# Patient Record
Sex: FEMALE | Race: OTHER | Employment: FULL TIME | URBAN - METROPOLITAN AREA
[De-identification: names, ages, dates, MRNs, and addresses within clinical notes are randomized per-mention and may not be internally consistent; named-entity substitution may affect disease eponyms.]

---

## 2022-04-06 ENCOUNTER — ANESTHESIA EVENT (OUTPATIENT)
Dept: SURGERY | Age: 57
End: 2022-04-06
Payer: COMMERCIAL

## 2022-04-08 ENCOUNTER — ANESTHESIA (OUTPATIENT)
Dept: SURGERY | Age: 57
End: 2022-04-08
Payer: COMMERCIAL

## 2022-04-08 ENCOUNTER — HOSPITAL ENCOUNTER (OUTPATIENT)
Age: 57
Setting detail: OUTPATIENT SURGERY
Discharge: HOME | End: 2022-04-08
Attending: ORTHOPAEDIC SURGERY | Admitting: ORTHOPAEDIC SURGERY
Payer: COMMERCIAL

## 2022-04-08 VITALS
WEIGHT: 145 LBS | TEMPERATURE: 97.9 F | DIASTOLIC BLOOD PRESSURE: 70 MMHG | SYSTOLIC BLOOD PRESSURE: 114 MMHG | HEIGHT: 62 IN | HEART RATE: 61 BPM | BODY MASS INDEX: 26.68 KG/M2 | RESPIRATION RATE: 16 BRPM | OXYGEN SATURATION: 99 %

## 2022-04-08 DIAGNOSIS — G89.18 ACUTE POST-OPERATIVE PAIN: Primary | ICD-10-CM

## 2022-04-08 LAB
GLUCOSE BLD STRIP.AUTO-MCNC: 93 MG/DL (ref 65–100)
POTASSIUM BLD-SCNC: 4.4 MMOL/L (ref 3.5–5.1)
SERVICE CMNT-IMP: NORMAL

## 2022-04-08 PROCEDURE — 76010000154 HC OR TIME FIRST 0.5 HR: Performed by: ORTHOPAEDIC SURGERY

## 2022-04-08 PROCEDURE — 76210000020 HC REC RM PH II FIRST 0.5 HR: Performed by: ORTHOPAEDIC SURGERY

## 2022-04-08 PROCEDURE — 76210000006 HC OR PH I REC 0.5 TO 1 HR: Performed by: ORTHOPAEDIC SURGERY

## 2022-04-08 PROCEDURE — 74011250636 HC RX REV CODE- 250/636: Performed by: NURSE PRACTITIONER

## 2022-04-08 PROCEDURE — 77030006788 HC BLD SAW OSC STRY -B: Performed by: ORTHOPAEDIC SURGERY

## 2022-04-08 PROCEDURE — 74011250636 HC RX REV CODE- 250/636: Performed by: ANESTHESIOLOGY

## 2022-04-08 PROCEDURE — 84132 ASSAY OF SERUM POTASSIUM: CPT

## 2022-04-08 PROCEDURE — 2709999900 HC NON-CHARGEABLE SUPPLY: Performed by: ORTHOPAEDIC SURGERY

## 2022-04-08 PROCEDURE — 28289 CORRJ HALUX RIGDUS W/O IMPLT: CPT | Performed by: ORTHOPAEDIC SURGERY

## 2022-04-08 PROCEDURE — 76060000032 HC ANESTHESIA 0.5 TO 1 HR: Performed by: ORTHOPAEDIC SURGERY

## 2022-04-08 PROCEDURE — 77030002982 HC SUT POLYSRB J&J -A: Performed by: ORTHOPAEDIC SURGERY

## 2022-04-08 PROCEDURE — 74011000250 HC RX REV CODE- 250: Performed by: ANESTHESIOLOGY

## 2022-04-08 PROCEDURE — 74011000250 HC RX REV CODE- 250: Performed by: ORTHOPAEDIC SURGERY

## 2022-04-08 PROCEDURE — 82962 GLUCOSE BLOOD TEST: CPT

## 2022-04-08 RX ORDER — LIDOCAINE HYDROCHLORIDE 10 MG/ML
0.1 INJECTION INFILTRATION; PERINEURAL AS NEEDED
Status: DISCONTINUED | OUTPATIENT
Start: 2022-04-08 | End: 2022-04-08 | Stop reason: HOSPADM

## 2022-04-08 RX ORDER — CEFAZOLIN SODIUM/WATER 2 G/20 ML
2 SYRINGE (ML) INTRAVENOUS ONCE
Status: COMPLETED | OUTPATIENT
Start: 2022-04-08 | End: 2022-04-08

## 2022-04-08 RX ORDER — CEPHALEXIN 500 MG/1
500 CAPSULE ORAL 4 TIMES DAILY
Qty: 12 CAPSULE | Refills: 0 | Status: SHIPPED | OUTPATIENT
Start: 2022-04-08

## 2022-04-08 RX ORDER — SODIUM CHLORIDE 0.9 % (FLUSH) 0.9 %
5-40 SYRINGE (ML) INJECTION EVERY 8 HOURS
Status: DISCONTINUED | OUTPATIENT
Start: 2022-04-08 | End: 2022-04-08 | Stop reason: HOSPADM

## 2022-04-08 RX ORDER — PROPOFOL 10 MG/ML
INJECTION, EMULSION INTRAVENOUS AS NEEDED
Status: DISCONTINUED | OUTPATIENT
Start: 2022-04-08 | End: 2022-04-08 | Stop reason: HOSPADM

## 2022-04-08 RX ORDER — SODIUM CHLORIDE, SODIUM LACTATE, POTASSIUM CHLORIDE, CALCIUM CHLORIDE 600; 310; 30; 20 MG/100ML; MG/100ML; MG/100ML; MG/100ML
75 INJECTION, SOLUTION INTRAVENOUS CONTINUOUS
Status: DISCONTINUED | OUTPATIENT
Start: 2022-04-08 | End: 2022-04-08 | Stop reason: HOSPADM

## 2022-04-08 RX ORDER — MIDAZOLAM HYDROCHLORIDE 1 MG/ML
2 INJECTION, SOLUTION INTRAMUSCULAR; INTRAVENOUS
Status: DISCONTINUED | OUTPATIENT
Start: 2022-04-08 | End: 2022-04-08 | Stop reason: HOSPADM

## 2022-04-08 RX ORDER — LIDOCAINE HYDROCHLORIDE 20 MG/ML
INJECTION, SOLUTION EPIDURAL; INFILTRATION; INTRACAUDAL; PERINEURAL AS NEEDED
Status: DISCONTINUED | OUTPATIENT
Start: 2022-04-08 | End: 2022-04-08 | Stop reason: HOSPADM

## 2022-04-08 RX ORDER — OXYCODONE HYDROCHLORIDE 5 MG/1
5 TABLET ORAL
Status: DISCONTINUED | OUTPATIENT
Start: 2022-04-08 | End: 2022-04-08 | Stop reason: HOSPADM

## 2022-04-08 RX ORDER — HYDROCODONE BITARTRATE AND ACETAMINOPHEN 5; 325 MG/1; MG/1
2 TABLET ORAL AS NEEDED
Status: DISCONTINUED | OUTPATIENT
Start: 2022-04-08 | End: 2022-04-08 | Stop reason: HOSPADM

## 2022-04-08 RX ORDER — SODIUM CHLORIDE 0.9 % (FLUSH) 0.9 %
5-40 SYRINGE (ML) INJECTION AS NEEDED
Status: DISCONTINUED | OUTPATIENT
Start: 2022-04-08 | End: 2022-04-08 | Stop reason: HOSPADM

## 2022-04-08 RX ORDER — ONDANSETRON 2 MG/ML
INJECTION INTRAMUSCULAR; INTRAVENOUS AS NEEDED
Status: DISCONTINUED | OUTPATIENT
Start: 2022-04-08 | End: 2022-04-08 | Stop reason: HOSPADM

## 2022-04-08 RX ORDER — BUPIVACAINE HYDROCHLORIDE 7.5 MG/ML
INJECTION, SOLUTION EPIDURAL; RETROBULBAR AS NEEDED
Status: DISCONTINUED | OUTPATIENT
Start: 2022-04-08 | End: 2022-04-08 | Stop reason: HOSPADM

## 2022-04-08 RX ORDER — MIDAZOLAM HYDROCHLORIDE 1 MG/ML
4 INJECTION, SOLUTION INTRAMUSCULAR; INTRAVENOUS ONCE
Status: DISCONTINUED | OUTPATIENT
Start: 2022-04-08 | End: 2022-04-08 | Stop reason: HOSPADM

## 2022-04-08 RX ORDER — FENTANYL CITRATE 50 UG/ML
100 INJECTION, SOLUTION INTRAMUSCULAR; INTRAVENOUS ONCE
Status: DISCONTINUED | OUTPATIENT
Start: 2022-04-08 | End: 2022-04-08 | Stop reason: HOSPADM

## 2022-04-08 RX ORDER — HYDROMORPHONE HYDROCHLORIDE 2 MG/ML
0.5 INJECTION, SOLUTION INTRAMUSCULAR; INTRAVENOUS; SUBCUTANEOUS
Status: DISCONTINUED | OUTPATIENT
Start: 2022-04-08 | End: 2022-04-08 | Stop reason: HOSPADM

## 2022-04-08 RX ORDER — PROPOFOL 10 MG/ML
INJECTION, EMULSION INTRAVENOUS
Status: DISCONTINUED | OUTPATIENT
Start: 2022-04-08 | End: 2022-04-08 | Stop reason: HOSPADM

## 2022-04-08 RX ORDER — ATORVASTATIN CALCIUM 10 MG/1
10 TABLET, FILM COATED ORAL DAILY
COMMUNITY

## 2022-04-08 RX ORDER — OXYCODONE HYDROCHLORIDE 5 MG/1
5 TABLET ORAL
Qty: 20 TABLET | Refills: 0 | Status: SHIPPED | OUTPATIENT
Start: 2022-04-08 | End: 2022-04-13

## 2022-04-08 RX ORDER — FENTANYL CITRATE 50 UG/ML
INJECTION, SOLUTION INTRAMUSCULAR; INTRAVENOUS AS NEEDED
Status: DISCONTINUED | OUTPATIENT
Start: 2022-04-08 | End: 2022-04-08 | Stop reason: HOSPADM

## 2022-04-08 RX ADMIN — PROPOFOL 50 MG: 10 INJECTION, EMULSION INTRAVENOUS at 12:10

## 2022-04-08 RX ADMIN — FENTANYL CITRATE 25 MCG: 50 INJECTION INTRAMUSCULAR; INTRAVENOUS at 12:18

## 2022-04-08 RX ADMIN — FENTANYL CITRATE 25 MCG: 50 INJECTION INTRAMUSCULAR; INTRAVENOUS at 12:17

## 2022-04-08 RX ADMIN — LIDOCAINE HYDROCHLORIDE 60 MG: 20 INJECTION, SOLUTION EPIDURAL; INFILTRATION; INTRACAUDAL; PERINEURAL at 12:10

## 2022-04-08 RX ADMIN — SODIUM CHLORIDE, SODIUM LACTATE, POTASSIUM CHLORIDE, AND CALCIUM CHLORIDE 75 ML/HR: 600; 310; 30; 20 INJECTION, SOLUTION INTRAVENOUS at 10:28

## 2022-04-08 RX ADMIN — Medication 2 G: at 12:05

## 2022-04-08 RX ADMIN — PROPOFOL 140 MCG/KG/MIN: 10 INJECTION, EMULSION INTRAVENOUS at 12:10

## 2022-04-08 RX ADMIN — ONDANSETRON 4 MG: 2 INJECTION INTRAMUSCULAR; INTRAVENOUS at 12:19

## 2022-04-08 RX ADMIN — PROPOFOL 30 MG: 10 INJECTION, EMULSION INTRAVENOUS at 12:11

## 2022-04-08 NOTE — DISCHARGE INSTRUCTIONS
INSTRUCTIONS FOLLOWING FOOT SURGERY    ACTIVITY  Elevate foot. No Ice    Protected partial weight bearing on the heel only as tolerated in post op shoe after full sensation returns. Let the office know if dressing gets saturated with water . Blood clot prevention:  As instructed by Dr Sarah Chavis: Take one 325mg aspirin daily if okay with your medical doctor and you have no GI ulcer. Get up and out of bed frequently. While in bed move the legs as much as possible)    DRESSING CARE Keep dry and in place until follow up appointment. Cover with cast bag or plastic bag when showering. CALL YOUR DOCTOR IF YOU HAVE  Excessive bleeding that does not stop after holding mild pressure over the area. Temperature of 101 degrees or above. Redness, excessive swelling or bruising, and/or green or yellow, smelly discharge from incision. Loss of sensation - cold, white or blue toes. DIET  Day of Surgery: Clear liquids until no nausea or vomiting; then light, bland diet (Baked chicken, plain rice, grits, scrambled egg, toast). Nothing Greasy, fried or spicy today  Advance to regular diet on second day, unless your doctor orders otherwise. PAIN  Take pain medications as directed by your doctor. Call your doctor if pain is NOT relieved by medication. PAIN MED SIDE EFFECTS  Constipation: Lots of fluids, try prune juice, then OTC stool softeners if necessary  Nausea: Take medication with food. MEDICATION INTERACTION:  During your procedure you potentially received a medication or medications which may reduce the effectiveness of oral contraceptives. Please consider other forms of contraception for 1 month following your procedure if you are currently using oral contraceptives as your primary form of birth control.  In addition to this, we recommend continuing your oral contraceptive as prescribed, unless otherwise instructed by your physician, during this time    After general anesthesia or intravenous sedation, for 24 hours or while taking prescription Narcotics:  · Limit your activities  · A responsible adult needs to be with you for the next 24 hours  · Do not drive and operate hazardous machinery  · Do not make important personal or business decisions  · Do not drink alcoholic beverages  · If you have not urinated within 8 hours after discharge, and you are experiencing discomfort from urinary retention, please go to the nearest ED. · If you have sleep apnea and have a CPAP machine, please use it for all naps and sleeping. · Please use caution when taking narcotics and any of your home medications that may cause drowsiness. *  Please give a list of your current medications to your Primary Care Provider. *  Please update this list whenever your medications are discontinued, doses are      changed, or new medications (including over-the-counter products) are added. *  Please carry medication information at all times in case of emergency situations. These are general instructions for a healthy lifestyle:  No smoking/ No tobacco products/ Avoid exposure to second hand smoke  Surgeon General's Warning:  Quitting smoking now greatly reduces serious risk to your health. Obesity, smoking, and sedentary lifestyle greatly increases your risk for illness  A healthy diet, regular physical exercise & weight monitoring are important for maintaining a healthy lifestyle    You may be retaining fluid if you have a history of heart failure or if you experience any of the following symptoms:  Weight gain of 3 pounds or more overnight or 5 pounds in a week, increased swelling in our hands or feet or shortness of breath while lying flat in bed. Please call your doctor as soon as you notice any of these symptoms; do not wait until your next office visit.

## 2022-04-08 NOTE — BRIEF OP NOTE
Brief Postoperative Note    Patient: Gadiel Hills  YOB: 1965  MRN: [id number]    Date of Procedure: 4/8/2022     Pre-Op Diagnosis: Hallux rigidus of left foot [M20.22]    Post-Op Diagnosis: Same as preoperative diagnosis.       Procedure(s):  LEFT 1ST METATARSOPHALANGEAL CHEILECTOMY    Surgeon(s):  Ana Rosa Ledesma MD    Surgical Assistant: None    Anesthesia: MAC     Estimated Blood Loss (mL): Minimal    Complications: None    Specimens: * No specimens in log *     Implants: * No implants in log *    Drains: * No LDAs found *    Findings:     Electronically Signed by Martin Traylor MD on 4/8/2022 at 12:34 PM

## 2022-04-08 NOTE — H&P
Outpatient Surgery History and Physical:  Criss Jones was seen and examined. CHIEF COMPLAINT:   Left foot     PE:     Visit Vitals  /69   Pulse 81   Temp 98.2 °F (36.8 °C)   Resp 12   Ht 5' 2\" (1.575 m)   Wt 145 lb (65.8 kg)   SpO2 99%   BMI 26.52 kg/m²       Heart:   Regular rhythm      Lungs:  Are clear      Past Medical History:    Patient Active Problem List    Diagnosis    Cutaneous lupus erythematosus    Irritable bowel syndrome       Surgical History:   Past Surgical History:   Procedure Laterality Date    ENDOSCOPY, COLON, DIAGNOSTIC  2/20/13    colonoscopy    HX DILATION AND CURETTAGE  2008       Social History: Patient  reports that she has never smoked. She has never used smokeless tobacco. She reports that she does not drink alcohol and does not use drugs. Family History:   Family History   Problem Relation Age of Onset    Cancer Father         colon    Heart Disease Father     Cancer Maternal Uncle         colon       Allergies: Reviewed per EMR  No Known Allergies    Medications:    No current facility-administered medications on file prior to encounter. Current Outpatient Medications on File Prior to Encounter   Medication Sig    atorvastatin (LIPITOR) 10 mg tablet Take 10 mg by mouth daily.  spironolactone (ALDACTONE) 100 mg tablet nightly. For Acne       The surgery is planned for the      Procedure: LEFT 1ST METATARSOPHALANGEAL CHEILECTOMY    History and physical has been reviewed. The patient has been examined. There have been no significant clinical changes since the completion of the originally dated History and Physical.  Patient identified by surgeon; surgical site was confirmed by patient and surgeon. The patient is here today for outpatient surgery. I have examined the patient, no changes are noted in the patient's medical status. Necessity for the procedure/care is still present and the history and physical above is current.   See the office notes for the full long term history of the problem. Please see the recent office notes for the musculoskeletal examination.     Signed By: Megan Winters MD     April 8, 2022 11:53 AM

## 2022-04-08 NOTE — ANESTHESIA PREPROCEDURE EVALUATION
Relevant Problems   No relevant active problems       Anesthetic History   No history of anesthetic complications            Review of Systems / Medical History  Patient summary reviewed and pertinent labs reviewed    Pulmonary  Within defined limits                 Neuro/Psych   Within defined limits           Cardiovascular  Within defined limits                Exercise tolerance: >4 METS     GI/Hepatic/Renal  Within defined limits              Endo/Other    Diabetes: well controlled, type 2         Other Findings   Comments: SLE           Physical Exam    Airway  Mallampati: II  TM Distance: 4 - 6 cm  Neck ROM: normal range of motion   Mouth opening: Normal     Cardiovascular  Regular rate and rhythm,  S1 and S2 normal,  no murmur, click, rub, or gallop             Dental         Pulmonary  Breath sounds clear to auscultation               Abdominal         Other Findings            Anesthetic Plan    ASA: 2  Anesthesia type: total IV anesthesia          Induction: Intravenous  Anesthetic plan and risks discussed with: Patient and Spouse      No block

## 2022-04-08 NOTE — ANESTHESIA POSTPROCEDURE EVALUATION
Procedure(s):  LEFT 1ST METATARSOPHALANGEAL CHEILECTOMY. total IV anesthesia    Anesthesia Post Evaluation      Multimodal analgesia: multimodal analgesia used between 6 hours prior to anesthesia start to PACU discharge  Patient location during evaluation: PACU  Patient participation: complete - patient participated  Level of consciousness: awake and alert  Pain score: 0  Pain management: satisfactory to patient  Airway patency: patent  Anesthetic complications: no  Cardiovascular status: acceptable and hemodynamically stable  Respiratory status: acceptable and spontaneous ventilation  Hydration status: acceptable  Post anesthesia nausea and vomiting:  none  Final Post Anesthesia Temperature Assessment:  Normothermia (36.0-37.5 degrees C)      INITIAL Post-op Vital signs:   Vitals Value Taken Time   /70 04/08/22 1239   Temp 36.6 °C (97.9 °F) 04/08/22 1234   Pulse 64 04/08/22 1243   Resp 16 04/08/22 1234   SpO2 100 % 04/08/22 1243   Vitals shown include unvalidated device data.

## 2022-04-08 NOTE — OP NOTES
FULL OP NOTE    PATIENT NAME: Jerry Sumner  MRN: [id number]    DATE OF SURGERY: 4/8/2022    PREOPERATIVE DIAGNOSIS: Hallux rigidus of left foot [M20.22]      POSTOPERATIVE DIAGNOSIS: Hallux rigidus of left foot [M20.22]      PROCEDURE: Left first MTP cheilectomy, [address]25    SURGEON: Hector Hurt MD        HARDWARE: * No implants in log *  INDICATIONS: This patient is a 61y.o. year old female with a history of Hallux rigidus of left foot [M20.22] who has failed conservative therapy and desires surgical treatment. Risks and benefits of the procedure including, but not limited to, anesthetic complications as well as surgical complications including damage to nerves and blood vessels, risk of infection, risk of incomplete pain relief, risk of malunion, nonunion and need for additional surgery have been discussed with the patient who wishes to proceed. PROCEDURE IN DETAIL: A time out was done to confirm the operating procedure, surgeon, patient and site. During the preop surgical timeout the left lower extremity was identified as the correct surgical site and prepped and draped in a standard sterile fashion using ChloraPrep solution. A field block was obtained with 0.75% plain Marcaine. A dorsal approach the first MTP joint was an open followed by longitudinal capsulotomy. There were several loose bodies which were removed using a rongeur. Osteophytes were removed from the base the proximal phalanx as well as both gutters using a rongeur. A sagittal saw was used to remove osteophytes in the metatarsal head. The wound was then irrigated and closed using Vicryl and the capsule followed by Monocryl nylon sutures on the skin. A sterile dressing was then applied. Anesthesia was discontinued. The patient was transferred back to recovery bed. She was taken to recovery in satisfactory condition. She appeared to tolerate the procedure well. There were no apparent surgical or anesthetic complications.   All needle and sponge counts were correct. TOURNIQUET TIME: Approx 12 minutes. SPECIMENS: none    ESTIMATED BLOOD LOSS: min mL.

## 2022-06-06 ENCOUNTER — HOSPITAL ENCOUNTER (OUTPATIENT)
Dept: PHYSICAL THERAPY | Age: 57
Setting detail: RECURRING SERIES
Discharge: HOME | End: 2022-06-09
Payer: COMMERCIAL

## 2022-06-06 PROCEDURE — 97161 PT EVAL LOW COMPLEX 20 MIN: CPT

## 2022-06-06 PROCEDURE — 97110 THERAPEUTIC EXERCISES: CPT

## 2022-06-06 ASSESSMENT — PAIN SCALES - GENERAL: PAINLEVEL_OUTOF10: 0

## 2022-06-06 NOTE — THERAPY EVALUATION
Agnela Mar  : 1965  Primary: [address]  Secondary:  [address] @ [address] [facility] [address]  [facility] [address]  Phone: [phone]  Fax: [phone] Plan Frequency: 1-2 visits per week for 6 weeks    Plan of Care/Certification Expiration Date: 22      PT Visit Info: Total # of Visits to Date: 1      OUTPATIENT PHYSICAL THERAPY: Initial Assessment 2022               Episode  Appt Desk         Treatment Diagnosis:  Difficulty in walking, Not elsewhere classified (R26.2); Pain in left foot (L70.827); Stiffness of left foot, not elsewhere classified (F44.077)    Medical/Referring Diagnosis:  Hallux rigidus of left foot (M20.22); left foot pain (V24.134); s/p L 1st metatarsophalangeal cheilectomy (22)  Referring Physician:  Nanda John MD MD Orders:  Evaluate and treat left foot - surgery done on 22 with Dr Ce Rowley - 2-3 times a week for 4-6 weeks (22)  Return MD Appt:  2022  Date of Onset:  Onset Date: 22     Allergies:  Patient has no known allergies. Restrictions/Precautions:    Restrictions/Precautions: None     Medications Last Reviewed:  2022     SUBJECTIVE   History of Injury/Illness (Reason for Referral):  Pt reports that for 5 years she has been having pain to L great toe. She underwent surgery on 22 to have osteophytes removed from 1st MTP. She reports that she was in a walking boot for 6 weeks following surgery. Has no pain at rest, but has pain with weightbearing on L foot and is unable to do yoga at this time secondary to foot pain in downward dog position. Primary goal is to reduce pain. She typically walks 10,000 steps per day but has reduced to 2,000 per day due to discomfort with prolonged WB'ing. Patient Stated Goal(s):  Less pain to L foot   Initial:     0 (5/10 with weightbearing through L LE)/10 Post Session:     0/10  Past Medical History/Comorbidities: per EMR  Ms. Pope  has a past medical history of Cutaneous lupus erythematosus, Diabetes ([address] and Irritable bowel syndrome. Ms. Mina Duggan  has a past surgical history that includes Dilation and curettage of uterus (2008) and Colonoscopy (2/20/13). Social History/Living Environment:   Type of Home: House  Home Layout: Two level     Prior Level of Function/Work/Activity:   Prior level of function: Independent  Occupation: Full time employment  Type of Occupation:   No data recordedNo data recorded   Learning:   Does the patient/guardian have any barriers to learning?: No barriers     Fall Risk Scale: Total Score: 0  Rudolph Fall Risk: Low (0-24)         OBJECTIVE   Observations:    Description: Patient ambulates without evidence of antalgic gait pattern. Toe-off appears to be symmetrical between each LE. Scar visible but well healed to dorsal surface of L geat toe.     Hip:  Regional Screen Hip  Lumbar Screen: ASIS level bilaterally, bilateral medial malleoli even in supine  AROM Hip (Degrees)  R Hip Flexion 0-125: 115  R Hip ABduction 0-45: 45  L Hip Flexion 0-125: 115  L Hip ABduction 0-45: 45  Strength Testing (MMT)  R Hip ABduction: 5/5  L Hip ABduction: 5/5  Muscle Length LE  90/90 SLR (Hamstring Tightness): Reduced hamstring flexibility to each LE, left more so than on the R.    Knee:  AROM Knee (Degrees)  General AROM LE: Right WFL,Left WFL  Strength Testing (MMT)  R Hip ABduction: 5/5  L Hip ABduction: 5/5    Foot/Ankle:  AROM Ankle (Degrees)  R Ankle Dorsiflexion 0-20: 20  R Ankle Plantar Flexion 0-45: 45  R Great Toe Extension AROM: 60  R Great Toe Flexion AROM: 45  L Ankle Dorsiflexion 0-20: 15  L Ankle Plantar Flexion 0-45: 40  L Great Toe Extension AROM: 30  L Great Toe Flexion AROM: 25  PROM Ankle (Degrees)  L Great Toe Extension PROM: 40  L Great Toe Flexion PROM: 30  Ankle Strength Testing (MMT)  R Ankle Plantar flexion:  (Able to perform 15 repetitions of unilateral calf raises )  L Ankle Plantar Flexion:  (Able to perform 10 repetitions of calf raises with pain.)    ASSESSMENT   Initial Assessment:  Patient is 8 weeks s/p cheilectomy to L great toe to reduce symptoms and limitations of hallux rigidus. She exhibits independence with mobility but exhibits limitations with L great toe range of motion and L plantarflexor strength. She is likely to benefit from continued PT to reduce her L LE discomfort. Problem List: (Impacting functional limitations): Body Structures, Functions, Activity Limitations Requiring Skilled Therapeutic Intervention: Decreased functional mobility ; Decreased ROM; Decreased tolerance to work activity; Decreased endurance; Increased pain     Therapy Prognosis:   Therapy Prognosis: Good     Assessment Complexity: Low complexity     PLAN   Effective Dates: 6/6/2022 TO Plan of Care/Certification Expiration Date: 07/18/22     Frequency/Duration: Plan Frequency: 1-2 visits per week for 6 weeks     Interventions Planned (Treatment may consist of any combination of the following):    Current Treatment Recommendations: Strengthening; ROM; Balance training; Gait training; Neuromuscular re-education; Manual Therapy - Soft Tissue Mobilization; Manual Therapy - Joint Manipulation; Home exercise program; Modalities; Integrated dry needling; Therapeutic activities     Goals: (Goals have been discussed and agreed upon with patient.)    Discharge Goals: Time Frame: 6 weeks  1. Patient will report <3/10 L foot pain when in weightbearing on L LE.  2. Patient will improve score on FAAM to 107/116.  3. Patient will have L great toe flexion to 45 degrees, and R great toe extension to 60 degrees. 4. Patient will be able to participate in yoga without limitation from L great toe pain. Outcome Measure: Tool Used: FOOT AND ANKLE ABILITY MEASURE  Score:  Initial: 95/116 Most Recent: X (Date: -- )   Interpretation of Score:  For the \"Activities of Daily Living\", there are 21 questions each scored on a 5 point scale with 0 representing \"Unable to do\" and 4 representing \"No difficulty\". The lower the score, the greater the functional disability. 84/84 represents no disability. Minimal detectable change is 5.7 points. With the addition of the 8 questions in the \"Sports Subscale,\" there are 29 questions, each scored on a 5 point scale with 0 representing \"Unable to do\" and 4 representing \"No difficulty\". The lower the score, the greater the functional disability. 116/116 represents no disability. Minimal detectable change is 12.3 points. Medical Necessity:   Skilled intervention continues to be required due to L great toe pain, L great toe stiffness, and limited participation in daily activities. .  Reason For Services/Other Comments:  Skilled intervention continues to be required due to L great toe pain, L great toe stiffness and limited participation in daily activities. Total Duration: 40  minutes  Time In: 4198  Time Out: 12    Regarding Tabatha Pope's therapy, I certify that the treatment plan above will be carried out by a therapist or under their direction. Thank you for this referral,    Kait Fox PT       Referring Physician Signature:     Bryce Torres MD    No Signature is Required for this note.         Post Session Pain  Charge Capture   POC Link  Treatment Note Link  MD Guidelines  [facility]

## 2022-06-06 NOTE — PROGRESS NOTES
Des Jessica  : 1965  Primary: [address]  Secondary:  [address] @ [address] [address]  Phone: [phone]  Fax: [phone] Plan Frequency: 1-2 visits per week for 6 weeks    Plan of Care/Certification Expiration Date: 22      PT Visit Info: Total # of Visits to Date: 1      OUTPATIENT PHYSICAL THERAPY:Treatme nt Note 2022       Episode  }Appt Desk              Treatment Diagnosis:  Difficulty in walking, Not elsewhere classified (R26.2); Pain in left foot (D31.402); Stiffness of left foot, not elsewhere classified (K76.088)  Medical/Referring Diagnosis:  Hallux rigidus of left foot (M20.22); left foot pain (T41.767); s/p L 1st metatarsophalangeal cheilectomy (22)  Referring Physician:  Salvador Turner MD MD Orders: Evaluate and treat left foot - surgery done on 22 with Dr Richard Brine - 2-3 times a week for 4-6 weeks (22)  Date of Onset:  Onset Date: 22     Allergies:   Patient has no known allergies. Restrictions/Precautions:  Restrictions/Precautions: None    Interventions Planned (Treatment may consist of any combination of the following):    Current Treatment Recommendations: Strengthening; ROM; Balance training; Gait training; Neuromuscular re-education; Manual Therapy - Soft Tissue Mobilization; Manual Therapy - Joint Manipulation; Home exercise program; Modalities; Integrated dry needling; Therapeutic activities     Subjective Comments:  See evaluation note for details. Initial:}    0 (5/10 with weightbearing through L LE)/10 Post Session:       0/10  Medications Last Reviewed:  2022  Updated Objective Findings:  See evaluation note from today  Treatment   THERAPEUTIC EXERCISES (10 minutes) to improve L great toe/foot flexibility and range of motion. Passive ranging to L great toe into end-range flexion and extension. Demonstrated to patient HEP.     Treatment/Session Summary:    · Treatment Assessment:  L great toe ROM limited. Did well with HEP. · Communication/Consultation:  None today  · Equipment provided today:  None   · Recommendations/Intent for next treatment session: Next visit will focus on improving L great toe ROM and L foot strength.     Total Treatment Billable Duration:  10 minutes of therex + 30 minutes for evaluation of ther:[id number]}  Time In: 6112  Time Out: 1035    Lonza Baton, PT       Charge Capture  }Post Session Pain  [facility] Portal  MD Guidelines  Scanned Media  Benefits  MyChart    Future Appointments   Date Time Provider Michelle Tammy   6/10/2022 10:45 AM Jannet Mcallister, PT [facility]   6/14/2022  1:15 PM Jannet Mcallister, PT [facility]   6/16/2022  1:30 PM Jannet Mcallister, PT [facility]   6/21/2022  1:30 PM Jannet Mcallister, PT [facility]   6/23/2022  1:30 PM Jannet Mcallister, PT [facility]   6/27/2022  9:45 AM Jannetumu Mcallister, PT [facility]   6/30/2022  1:30 PM Jannetumu Mcallister, PT [facility]   7/6/2022  1:40 PM Sumeet Zaldivar III, MD [facility]

## 2022-06-10 ENCOUNTER — HOSPITAL ENCOUNTER (OUTPATIENT)
Dept: PHYSICAL THERAPY | Age: 57
Setting detail: RECURRING SERIES
Discharge: HOME | End: 2022-06-13
Payer: COMMERCIAL

## 2022-06-10 PROCEDURE — 97110 THERAPEUTIC EXERCISES: CPT

## 2022-06-10 PROCEDURE — 97140 MANUAL THERAPY 1/> REGIONS: CPT

## 2022-06-10 NOTE — PROGRESS NOTES
Eulalio Hill  : 1965  Primary: [address]  Secondary:  [address] @ [address] [address]  Phone: [phone]  Fax: [phone] Plan Frequency: 1-2 visits per week for 6 weeks    Plan of Care/Certification Expiration Date: 22      PT Visit Info: Total # of Visits to Date: 2      OUTPATIENT PHYSICAL THERAPY:Treatme nt Note 6/10/2022       Episode  }Appt Desk              Treatment Diagnosis:  Difficulty in walking, Not elsewhere classified (R26.2); Pain in left foot (G73.098); Stiffness of left foot, not elsewhere classified (H30.185)  Medical/Referring Diagnosis:  Hallux rigidus of left foot (M20.22); left foot pain (X50.024); s/p L 1st metatarsophalangeal cheilectomy (22)  Referring Physician:  Pj Andres MD MD Orders: Evaluate and treat left foot - surgery done on 22 with Dr Bryan Miller - 2-3 times a week for 4-6 weeks (22)  Date of Onset:  Onset Date: 22     Allergies:   Patient has no known allergies. Restrictions/Precautions:  Restrictions/Precautions: None    Interventions Planned (Treatment may consist of any combination of the following):    Current Treatment Recommendations: Strengthening; ROM; Balance training; Gait training; Neuromuscular re-education; Manual Therapy - Soft Tissue Mobilization; Manual Therapy - Joint Manipulation; Home exercise program; Modalities; Integrated dry needling; Therapeutic activities     Subjective Comments:  Foot is feeling ok today. Initial:}     (No pain)/10 Post Session:        (No pain afterward)/10  Medications Last Reviewed:  6/10/2022  Updated Objective Findings:  See evaluation note from today  Treatment   MANUAL THERAPY (15 minutes) to improve L great toe motion and reduce L foot discomfort. Grade 3-4 physio mobilizations to L great toe with distraction. Grade 3 accessory mobilizations to 1st MTP joint.  Grade 3-4 physio mobilizations to L ankle for improved dorsiflexion and reduced L foot/ankle tightness. THERAPEUTIC EXERCISES (20 minutes) to improve L great toe/foot flexibility and range of motion. Passive ranging to L great toe into end-range flexion and extension. Demonstrated to patient HEP    -Weight shifts x 10  - 4\" stair taps x 20  - 2\" stepdowns on L foot with emphasis on L great toe extension x 20  - Hamstring stretches to each LE 3 x 20\" B  - Calf stretches 3 x 20\" B    Treatment/Session Summary:    · Treatment Assessment:  Pt did well with exercises. L great toe remains stiff, but was able to tolerate exercises without increased pain reported. Will advance to more difficult exercises at next appointment. · Communication/Consultation:  None today  · Equipment provided today:  None   · Recommendations/Intent for next treatment session: Next visit will focus on improving L great toe ROM and L foot strength.     Total Treatment Billable Duration:  35 minutesof ther:06446}  Time In: 4109  Time Out: Marlene Burgos. LINDSAY, PT       Charge Capture  }Post Session Pain  MedBridge Portal  MD Guidelines  Scanned Media  Benefits  MyChart    Future Appointments   Date Time Provider Michelle Munoz   6/14/2022  1:15 PM Matthew Rogers, PT [facility]   6/16/2022  1:30 PM Matthew Rogers, PT [facility]   6/21/2022  1:30 PM Jewendiene , PT [facility]   6/23/2022  1:30 PM Matthew Rogers, PT [facility]   6/27/2022  9:45 AM Matthew Rogers, PT [facility]   6/30/2022  1:30 PM Matthew Rogers, PT [facility]   7/6/2022  1:40 PM Danica Fitzgerald III, MD [facility]

## 2022-06-14 ENCOUNTER — HOSPITAL ENCOUNTER (OUTPATIENT)
Dept: PHYSICAL THERAPY | Age: 57
Setting detail: RECURRING SERIES
Discharge: HOME | End: 2022-06-17
Payer: COMMERCIAL

## 2022-06-14 PROCEDURE — 97530 THERAPEUTIC ACTIVITIES: CPT

## 2022-06-14 PROCEDURE — 97140 MANUAL THERAPY 1/> REGIONS: CPT

## 2022-06-15 NOTE — PROGRESS NOTES
Dave Miller  : 1965  Primary: [address]  Secondary:  [address] @ [address] [address]  Phone: [phone]  Fax: [phone] Plan Frequency: 1-2 visits per week for 6 weeks    Plan of Care/Certification Expiration Date: 22      PT Visit Info: Total # of Visits to Date: 3      OUTPATIENT PHYSICAL THERAPY:Treatme nt Note 2022       Episode  }Appt Desk              Treatment Diagnosis:  Difficulty in walking, Not elsewhere classified (R26.2); Pain in left foot (Q87.922); Stiffness of left foot, not elsewhere classified (Y09.041)  Medical/Referring Diagnosis:  Hallux rigidus of left foot (M20.22); left foot pain (K21.498); s/p L 1st metatarsophalangeal cheilectomy (22)  Referring Physician:  Addison Ramey MD MD Orders: Evaluate and treat left foot - surgery done on 22 with Dr Dirk Dudley - 2-3 times a week for 4-6 weeks (22)  Date of Onset:  Onset Date: 22     Allergies:   Patient has no known allergies. Restrictions/Precautions:  Restrictions/Precautions: None    Interventions Planned (Treatment may consist of any combination of the following):    Current Treatment Recommendations: Strengthening; ROM; Balance training; Gait training; Neuromuscular re-education; Manual Therapy - Soft Tissue Mobilization; Manual Therapy - Joint Manipulation; Home exercise program; Modalities; Integrated dry needling; Therapeutic activities     Subjective Comments:  Patient reports that her foot is feeling ok today. No pain upon arrival.  Initial:}     (No pain)/10 Post Session:        (No pain after PT)/10  Medications Last Reviewed:  2022  Updated Objective Findings:  See evaluation note from today  Treatment   MANUAL THERAPY (25 minutes) to improve L great toe motion and reduce L foot discomfort. Grade 3-4 physio mobilizations to L great toe with distraction. Grade 3 accessory mobilizations to 1st MTP joint.  Grade 3-4 physio mobilizations to L ankle for improved dorsiflexion and reduced L foot/ankle tightness. THERAPEUTIC EXERCISES (15 minutes) to improve L great toe/foot flexibility and range of motion. Stretches to each hamstring in supine (3 x 20\" B) to improve LE flexibility.    - Calf stretches 3 x 20\" B  - Calf raises x 20 B  - Step up and over, 6\" step x 10  - Single leg balance 5 x 10-15 seconds on L LE    Treatment/Session Summary:    · Treatment Assessment:  L great toe ROM is progressing, and she was able to perform exercises without any reports of discomfort. Tight to each hamstring, R>L,  · Communication/Consultation:  None today  · Equipment provided today:  None   · Recommendations/Intent for next treatment session: Next visit will focus on improving L great toe ROM and L foot strength.     Total Treatment Billable Duration:  40 minutesof ther:01163}  Time In: 0166  Time Out: [address] [facility],        Charge Capture  }Post Session Pain  MedBridge Portal  MD Guidelines  Scanned Media  Benefits  MyChart    Future Appointments   Date Time Provider Michelle Munoz   6/16/2022  1:30 PM Duane Grant, PT [facility]   6/21/2022  1:30 PM Duane Grant PT [facility]   6/23/2022  1:30 PM Duane Grant PT [facility]   6/27/2022  9:45 AM Duane Grant PT [facility]   6/30/2022  1:30 PM Duane Grant PT [facility]   7/6/2022  1:40 PM Mindy Best III, MD [facility]

## 2022-06-16 ENCOUNTER — HOSPITAL ENCOUNTER (OUTPATIENT)
Dept: PHYSICAL THERAPY | Age: 57
Setting detail: RECURRING SERIES
Discharge: HOME | End: 2022-06-19
Payer: COMMERCIAL

## 2022-06-16 PROCEDURE — 97110 THERAPEUTIC EXERCISES: CPT

## 2022-06-16 PROCEDURE — 97140 MANUAL THERAPY 1/> REGIONS: CPT

## 2022-06-16 ASSESSMENT — PAIN SCALES - GENERAL: PAINLEVEL_OUTOF10: 0

## 2022-06-16 NOTE — PROGRESS NOTES
Rosa Randall  : 1965  Primary: [address]  Secondary:  [address] @ [address] [address]  Phone: [phone]  Fax: [phone] Plan Frequency: 1-2 visits per week for 6 weeks    Plan of Care/Certification Expiration Date: 22      PT Visit Info: Total # of Visits to Date: 4      OUTPATIENT PHYSICAL THERAPY:Treatme nt Note 2022       Episode  }Appt Desk              Treatment Diagnosis:  Difficulty in walking, Not elsewhere classified (R26.2); Pain in left foot (R06.263); Stiffness of left foot, not elsewhere classified (A99.400)  Medical/Referring Diagnosis:  Hallux rigidus of left foot (M20.22); left foot pain (W28.527); s/p L 1st metatarsophalangeal cheilectomy (22)  Referring Physician:  Darcy Juarez MD MD Orders: Evaluate and treat left foot - surgery done on 22 with Dr Whit Wu - 2-3 times a week for 4-6 weeks (22)  Date of Onset:  Onset Date: 22     Allergies:   Patient has no known allergies. Restrictions/Precautions:  Restrictions/Precautions: None    Interventions Planned (Treatment may consist of any combination of the following):    Current Treatment Recommendations: Strengthening; ROM; Balance training; Gait training; Neuromuscular re-education; Manual Therapy - Soft Tissue Mobilization; Manual Therapy - Joint Manipulation; Home exercise program; Modalities; Integrated dry needling; Therapeutic activities     Subjective Comments:  Patient attempted yoga over the weekend and was able to do it but it was painful. Feels that her L great toe range of motion has improved a lot in a short time. Initial:}    0/10 Post Session:        (Not rated)/10  Medications Last Reviewed:  2022  Updated Objective Findings:  None Today  Treatment   MANUAL THERAPY (10 minutes) to improve L great toe motion and reduce L foot discomfort. Grade 3-4 physio mobilizations to L great toe with distraction.  Grade 3 accessory mobilizations to 1st MTP joint. Grade 3-4 physio mobilizations to L ankle for improved dorsiflexion and reduced L foot/ankle tightness. THERAPEUTIC EXERCISES (25 minutes) to improve L great toe/foot flexibility and range of motion. Stretches to each hamstring in supine (3 x 20\" B) to improve LE flexibility.    - Calf stretches 3 x 20\" B  - Calf raises, unilateral on L 3 x 10   - Calf raises on shuttle press, 37# 3 x 10-12 L  - Single leg balance 3 x 15-20 seconds on [employer]  - Seated great toe extensions in attempt to isolate L great toe, x 20  - Planks on toes and elbows 5 x 10-15 seconds    Treatment/Session Summary:    · Treatment Assessment:  Continues to have improvement with L great toe extension. · Communication/Consultation:  None today  · Equipment provided today:  None   · Recommendations/Intent for next treatment session: Next visit will focus on improving L great toe ROM and L foot strength.     Total Treatment Billable Duration:  35 minutesof ther:87344}  Time In: 1330  Time Out: 1405    Carlos Lai, PT       Charge Capture  }Post Session Pain  MedBridge Portal  MD Guidelines  Scanned Media  Benefits  MyChart    Future Appointments   Date Time Provider Michelle Munoz   6/21/2022  1:30 PM Sophia Medici, PT [facility]   6/23/2022  1:30 PM Sophia Medici, PT [facility]   6/27/2022  9:45 AM Sophia Medici, PT [facility]   6/30/2022  1:30 PM Sophia Medici, PT [facility]   7/6/2022  1:40 PM Maninder Farias III, MD [facility]

## 2022-06-21 ENCOUNTER — HOSPITAL ENCOUNTER (OUTPATIENT)
Dept: PHYSICAL THERAPY | Age: 57
Setting detail: RECURRING SERIES
Discharge: HOME | End: 2022-06-24
Payer: COMMERCIAL

## 2022-06-21 PROCEDURE — 97140 MANUAL THERAPY 1/> REGIONS: CPT

## 2022-06-21 PROCEDURE — 97110 THERAPEUTIC EXERCISES: CPT

## 2022-06-21 NOTE — PROGRESS NOTES
Dave Miller  : 1965  Primary: [address]  Secondary:  [address] @ [address]  [place] [address]  Phone: [phone]  Fax: [phone] Plan Frequency: 1-2 visits per week for 6 weeks    Plan of Care/Certification Expiration Date: 22      PT Visit Info: Total # of Visits to Date: 4      OUTPATIENT PHYSICAL THERAPY:Treatment Note 2022       Episode  }Appt Desk              Treatment Diagnosis:  Difficulty in walking, Not elsewhere classified (R26.2); Pain in left foot (G08.189); Stiffness of left foot, not elsewhere classified (E15.892)  Medical/Referring Diagnosis:  Hallux rigidus of left foot (M20.22); left foot pain (L74.458); s/p L 1st metatarsophalangeal cheilectomy (22)  Referring Physician:  Addison Ramey MD MD Orders: Evaluate and treat left foot - surgery done on 22 with Dr Dirk Dudley - 2-3 times a week for 4-6 weeks (22)  Date of Onset:  Onset Date: 22     Allergies:   Patient has no known allergies. Restrictions/Precautions:  Restrictions/Precautions: None    Interventions Planned (Treatment may consist of any combination of the following):    Current Treatment Recommendations: Strengthening; ROM; Balance training; Gait training; Neuromuscular re-education; Manual Therapy - Soft Tissue Mobilization; Manual Therapy - Joint Manipulation; Home exercise program; Modalities; Integrated dry needling; Therapeutic activities     Subjective Comments:     Initial:}     /10 Post Session:        /10  Medications Last Reviewed:  2022  Updated Objective Findings:  None Today  Treatment   MANUAL THERAPY (20 minutes) to improve L great toe motion and reduce L foot discomfort. Grade 3-4 physio mobilizations to L great toe with distraction. Grade 3 accessory mobilizations to 1st MTP joint. Grade 3-4 physio mobilizations to L ankle for improved dorsiflexion and reduced L foot/ankle tightness.  Soft tissue mobilizations to bilateral hamstrings and bilateral gastrocnemius to reduce tightness. THERAPEUTIC EXERCISES (15 minutes) to improve L great toe/foot flexibility and range of motion. Stretches to each hamstring in supine (3 x 20\" B) to improve LE flexibility.    - Calf stretches 3 x 20\" B  - Backward step up 4\" 2 x 15 L  - Calf raises on shuttle press, 37# 3 x 10-12 L  - Walking calf raises (heel contact to calf raise) 30' x 4    Treatment/Session Summary:    · Treatment Assessment:  L great toe motion is improving. Was able to perform calf raises without complaints of L great toe pain. · Communication/Consultation:  None today  · Equipment provided today:  None   · Recommendations/Intent for next treatment session: Next visit will focus on improving L great toe ROM and L foot strength.     Total Treatment Billable Duration:  35 minutesof ther:83080}  Time In: 1330  Time Out: 1405    Sheri Jay, PT       Charge Capture  }Post Session Pain  MedBridge Portal  MD Guidelines  Scanned Media  Benefits  MyChart    Future Appointments   Date Time Provider Michelle Munoz   6/23/2022  1:30 PM Mennie Beverage, PT [facility]   6/27/2022  9:45 AM Mennie Beverage, PT [facility]   6/30/2022  1:30 PM Mennie Beverage, PT [facility]   7/6/2022  1:40 PM Krish Garcia III, MD [facility]

## 2022-06-23 ENCOUNTER — HOSPITAL ENCOUNTER (OUTPATIENT)
Dept: PHYSICAL THERAPY | Age: 57
Setting detail: RECURRING SERIES
Discharge: HOME | End: 2022-06-26
Payer: COMMERCIAL

## 2022-06-23 PROCEDURE — 97140 MANUAL THERAPY 1/> REGIONS: CPT

## 2022-06-23 PROCEDURE — 97110 THERAPEUTIC EXERCISES: CPT

## 2022-06-23 ASSESSMENT — PAIN SCALES - GENERAL: PAINLEVEL_OUTOF10: 0

## 2022-06-23 NOTE — PROGRESS NOTES
Sravan Gareth  : 1965  Primary: [address]  Secondary:  [address] @ [address] [address]  Phone: [phone]  Fax: [phone] Plan Frequency: 1-2 visits per week for 6 weeks    Plan of Care/Certification Expiration Date: 22      PT Visit Info: Total # of Visits to Date: 5      OUTPATIENT PHYSICAL THERAPY:Treatment Note 2022       Episode  }Appt Desk              Treatment Diagnosis:  Difficulty in walking, Not elsewhere classified (R26.2); Pain in left foot (H85.266); Stiffness of left foot, not elsewhere classified (D13.096)  Medical/Referring Diagnosis:  Hallux rigidus of left foot (M20.22); left foot pain (O88.135); s/p L 1st metatarsophalangeal cheilectomy (22)  Referring Physician:  Beni Lopes MD MD Orders: Evaluate and treat left foot - surgery done on 22 with Dr Verlan Schaumann - 2-3 times a week for 4-6 weeks (22)  Date of Onset:  Onset Date: 22     Allergies:   Patient has no known allergies. Restrictions/Precautions:  Restrictions/Precautions: None    Interventions Planned (Treatment may consist of any combination of the following):    Current Treatment Recommendations: Strengthening; ROM; Balance training; Gait training; Neuromuscular re-education; Manual Therapy - Soft Tissue Mobilization; Manual Therapy - Joint Manipulation; Home exercise program; Modalities; Integrated dry needling; Therapeutic activities     Subjective Comments:  Big toe on L foot really isn't hurting much now. Still has some difficulty with yoga but feels most of the discomfort at L great toe incision. Initial:}    0/10 Post Session:       0/10  Medications Last Reviewed:  2022  Updated Objective Findings:  Bilateral 1st MTP PROM symmetrical for extension and flexion  Treatment   MANUAL THERAPY (15 minutes) to improve L great toe motion and reduce L foot discomfort.  Grade 3-4 physio mobilizations to L great toe with distraction. Grade 3 accessory mobilizations to 1st MTP joint. Grade 3-4 physio mobilizations to L ankle for improved dorsiflexion and reduced L foot/ankle tightness. THERAPEUTIC EXERCISES (20 minutes) to improve L great toe/foot flexibility and range of motion. Stretches to each hamstring in supine (3 x 20\" B) to improve LE flexibility. Contract-relax to L hamstring for improved flexibility.     - Calf stretches 3 x 20\" B  - Calf raises on shuttle press, 37.5# 3 x 12 L  - Single leg calf raises 3 x 10 L  - Walking calf raises bilateral, 30' x 3  - Backwards walking with emphasis on weight acceptance through toes 30' x 3  - Single leg stance 5 x 20-30 seconds on L LE  - Seated hamstring stretches in chair, 3 x 20\" L    HEP: Pt provided with handout for seated hamstring stretches bilaterally. Treatment/Session Summary:    · Treatment Assessment:  L great toe motion is symmetrical with R for both flexoin and extension. Remains tight to bilateral hamstrings but is otherwise doing very well. · Communication/Consultation:  None today  · Equipment provided today:  None   · Recommendations/Intent for next treatment session: Next visit will focus on improving L great toe ROM and L foot strength.     Total Treatment Billable Duration:  35 minutesof ther:80415}  Time In: 1330  Time Out: 1405    Rama Price PT       Charge Capture  }Post Session Pain  MedBridge Portal  MD Guidelines  Scanned Media  Benefits  MyChart    Future Appointments   Date Time Provider Michelle Munoz   6/27/2022  9:45 AM Yamel Beal PT [facility]   6/30/2022  1:30 PM Yamel Beal PT [facility]   7/6/2022  1:40 PM Arsenio Paget III, MD [facility]

## 2022-06-27 ENCOUNTER — APPOINTMENT (OUTPATIENT)
Dept: PHYSICAL THERAPY | Age: 57
End: 2022-06-27
Payer: COMMERCIAL

## 2022-06-30 ENCOUNTER — APPOINTMENT (OUTPATIENT)
Dept: PHYSICAL THERAPY | Age: 57
End: 2022-06-30
Payer: COMMERCIAL

## 2022-07-05 ENCOUNTER — HOSPITAL ENCOUNTER (OUTPATIENT)
Dept: PHYSICAL THERAPY | Age: 57
Setting detail: RECURRING SERIES
Discharge: HOME | End: 2022-07-08
Payer: COMMERCIAL

## 2022-07-05 PROCEDURE — 97110 THERAPEUTIC EXERCISES: CPT

## 2022-07-05 NOTE — PROGRESS NOTES
Kateryna Winters  : 1965  Primary: Jovana Robertson  Secondary:  [address] @ [address] [address]  Phone: [phone]  Fax: [phone] Plan Frequency: 1-2 visits per week for 6 weeks    Plan of Care/Certification Expiration Date: 22      PT Visit Info: Total # of Visits to Date: 6      OUTPATIENT PHYSICAL THERAPY:Treatment Note 2022       Episode  }Appt Desk              Treatment Diagnosis:  Difficulty in walking, Not elsewhere classified (R26.2); Pain in left foot (F25.758); Stiffness of left foot, not elsewhere classified (W22.247)  Medical/Referring Diagnosis:  Hallux rigidus of left foot (M20.22); left foot pain (R01.289); s/p L 1st metatarsophalangeal cheilectomy (22)  Referring Physician:  Dianne Nava MD MD Orders: Evaluate and treat left foot - surgery done on 22 with Dr Phoebe Jones - 2-3 times a week for 4-6 weeks (22)  Date of Onset:  Onset Date: 22     Allergies:   Patient has no known allergies. Restrictions/Precautions:  Restrictions/Precautions: None    Interventions Planned (Treatment may consist of any combination of the following):    Current Treatment Recommendations: Strengthening; ROM; Balance training; Gait training; Neuromuscular re-education; Manual Therapy - Soft Tissue Mobilization; Manual Therapy - Joint Manipulation; Home exercise program; Modalities; Integrated dry needling; Therapeutic activities     Subjective Comments:  Her L foot has been feeling good for the most part. Has some occasional discomfort to incision but otherwise minimal issues. Initial:}     (None)/10 Post Session:        (Not rated)/10  Medications Last Reviewed:  2022  Updated Objective Findings:  None Today  Treatment   MANUAL THERAPY (15 minutes) to improve L great toe motion and reduce L foot discomfort. Grade 3-4 physio mobilizations to L great toe with distraction.  Grade 3-4 accessory mobilizations to 1st MTP joint. Grade 3-4 physio mobilizations to L ankle for improved dorsiflexion and reduced L foot/ankle tightness. THERAPEUTIC EXERCISES (20 minutes) to improve L great toe/foot flexibility and range of motion. Stretches to each hamstring in supine (3 x 20\" B) to improve LE flexibility.     - Calf stretches 3 x 20\" B  - Calf raises on shuttle press, 37.5# 3 x 12 L  - Single leg calf raises 2 x 10 L  - Single leg stance 5 x 20-30 seconds on L LE on level ground  - Hamstring curls, machine 25# 3 x 10 B  - Step ups 8\" 3 x 10 L      Treatment/Session Summary:    · Treatment Assessment:  L great toe motion is doing well. Remains tight to bilateral hamstrings, left more than right. · Communication/Consultation:  None today  · Equipment provided today:  None   · Recommendations/Intent for next treatment session: Next visit will focus on improving L great toe ROM and L foot strength.     Total Treatment Billable Duration:  35 minutesof ther:43749}  Time In: 9933  Time Out: 1550    Pantera Lion PT       Charge Capture  }Post Session Pain  MedBridge Portal  MD Guidelines  Scanned Media  Benefits  MyChart    Future Appointments   Date Time Provider Michelle Munoz   7/6/2022  1:40 PM MERLE Sheets - CNP [facility]   7/7/2022 11:45 AM Shoaib Cruz PT [facility]

## 2022-07-06 ENCOUNTER — OFFICE VISIT (OUTPATIENT)
Dept: ORTHOPEDIC SURGERY | Age: 57
End: 2022-07-06

## 2022-07-06 DIAGNOSIS — M20.22 HALLUX RIGIDUS OF LEFT FOOT: Primary | ICD-10-CM

## 2022-07-06 PROCEDURE — 99024 POSTOP FOLLOW-UP VISIT: CPT | Performed by: NURSE PRACTITIONER

## 2022-07-06 NOTE — PROGRESS NOTES
Name: Amanda Hill  YOB: 1965  Gender: female  MRN: [id number]     Procedure Performed: Left 1st Metatarsophalangeal Cheilectomy - Left             Date of Procedure: 4/8/2022       Subjective: Patient is doing well overall she is very pleased with her progress from physical therapy and the mobility that she is regained in the operative toe. Physical Examination: Her incision is well-healed there are no signs of infection, there is minimal to no swelling present throughout the great toe and along the dorsum of the foot. Skin is warm dry and she has palpable pulses and intact sensation to the foot. She has no pain with range of motion to the great toe. Imaging:   No imaging reviewed                      Assessment:   Status post left first MTP cheilectomy. Plan:   3 This is stable chronic illness/condition  Treatment at this time: Patient may continue her current treatment this time including wearing comfortable shoes as she can tolerate and swelling allows, she will continue to elevate the affected extremity as needed for swelling, she will continue physical therapy and diligent exercises at home, there are no restrictions on her physical activities at this time. She may follow-up on as-needed basis.   Studies ordered: NO XR needed @ Next Visit    Weight-bearing status: WBAT        Return to work/work restrictions: none  none

## 2022-07-07 ENCOUNTER — HOSPITAL ENCOUNTER (OUTPATIENT)
Dept: PHYSICAL THERAPY | Age: 57
Setting detail: RECURRING SERIES
Discharge: HOME | End: 2022-07-10
Payer: COMMERCIAL

## 2022-07-07 PROCEDURE — 97110 THERAPEUTIC EXERCISES: CPT

## 2022-07-07 NOTE — PROGRESS NOTES
Justin Pond  : 1965  Primary: Yang Robertson  Secondary:  [address] @ [address] [address]  Phone: [phone]  Fax: [phone] Plan Frequency: 1-2 visits per week for 6 weeks    Plan of Care/Certification Expiration Date: 22      PT Visit Info: Total # of Visits to Date: 7      OUTPATIENT PHYSICAL THERAPY:Treatment Note 2022       Episode  }Appt Desk              Treatment Diagnosis:  Difficulty in walking, Not elsewhere classified (R26.2); Pain in left foot (E44.123); Stiffness of left foot, not elsewhere classified (H49.025)  Medical/Referring Diagnosis:  Hallux rigidus of left foot (M20.22); left foot pain (Q49.122); s/p L 1st metatarsophalangeal cheilectomy (22)  Referring Physician:  Kimmy Cartagena MD MD Orders: Evaluate and treat left foot - surgery done on 22 with Dr Maddy Edmondson - 2-3 times a week for 4-6 weeks (22)  Date of Onset:  Onset Date: 22     Allergies:   Patient has no known allergies. Restrictions/Precautions:  Restrictions/Precautions: None    Interventions Planned (Treatment may consist of any combination of the following):    Current Treatment Recommendations: Strengthening; ROM; Balance training; Gait training; Neuromuscular re-education; Manual Therapy - Soft Tissue Mobilization; Manual Therapy - Joint Manipulation; Home exercise program; Modalities; Integrated dry needling; Therapeutic activities     Subjective Comments:  Saw MD yesterday who told her that the foot has healed well. Would like for today to be her last day of therapy. Is now able to put on closed toe shoes that previously irritated her toe. Will resume yoga next week.   Initial:}     (No pain)/10 Post Session:        (No pain)/10  Medications Last Reviewed:  2022  Updated Objective Findings:  Bilateral LEs level in supine, ASIS symmetrical/level  Treatment     THERAPEUTIC EXERCISES (35 minutes) in order to facilitate independence with HEP. Hamstring stretches to R LE (3 x 20\"). Patient performed side-lying hip flexor/quadriceps stretches, hamstring stretches with strap/towel, clamshells with blue theraband, calf raises, calf stretches, posterior pelvic tilts, and bridging to facilitate continued improvement with symptoms. HEP: Patient received handout for the exercises above. Treatment/Session Summary:    · Treatment Assessment:  Demonstrated good understanding and performance with HEP. Hamstrings remain tight, espeically on R LE.  · Communication/Consultation:  None today  · Equipment provided today:  None   · Recommendations/Intent for next treatment session: Patient will be discharged to aforementioned HEP. Total Treatment Billable Duration:  35 minutesof ther:[id number]}  Time In: 1345  Time Out: 1425    NADIA MONTOYA, PT       Charge Capture  }Post Session Pain  [facility] Portal  MD Guidelines  Scanned Media  Benefits  MyChart    No future appointments.

## 2022-07-12 NOTE — THERAPY DISCHARGE
Hola Menon  : 1965  Primary: Zulema Somers Sc  Secondary:  [address] @ [address] [address]  Phone: [phone]  Fax: [phone] Plan Frequency: 1-2 visits per week for 6 weeks    Plan of Care/Certification Expiration Date: 22      PT Visit Info: Total # of Visits to Date: 7      OUTPATIENT PHYSICAL THERAPY: Discharge Assessment 2022               Episode  Appt Desk         Treatment Diagnosis:  Difficulty in walking, Not elsewhere classified (R26.2); Pain in left foot (E28.996); Stiffness of left foot, not elsewhere classified (W85.775)    Medical/Referring Diagnosis:  Hallux rigidus of left foot (M20.22); left foot pain (Z82.533); s/p L 1st metatarsophalangeal cheilectomy (22)  Referring Physician:  Mario Arevalo MD MD Orders:  Evaluate and treat left foot - surgery done on 22 with Dr Michael Mcfadden - 2-3 times a week for 4-6 weeks (22)  Return MD Appt:  2022  Date of Onset:  Onset Date: 22     Allergies:  Patient has no known allergies. Restrictions/Precautions:    Restrictions/Precautions: None     Medications Last Reviewed:  2022        ASSESSMENT   Discharge Assessment:  Patient is appropriate at this time for discharge to [facility]. She has made satisfactory progress towards completion of her goals and is in agreement with the PT that she can discharge to [facility] for continued progress. PLAN     Goals: (Goals have been discussed and agreed upon with patient.)    Discharge Goals  Patient will report <3/10 L foot pain when in weightbearing on L LE. MET 22  Patient will improve score on FAAM to 107/116. Patient will have L great toe flexion to 45 degrees, and R great toe extension to 60 degrees. MET 22  Patient will be able to participate in yoga without limitation from L great toe pain. Ongoing 22         Outcome Measure:    Tool Used: FOOT AND ANKLE ABILITY MEASURE  Score:  Initial: 95/116 Most Recent: X (Date: -- )   Interpretation of Score: For the \"Activities of Daily Living\", there are 21 questions each scored on a 5 point scale with 0 representing \"Unable to do\" and 4 representing \"No difficulty\". The lower the score, the greater the functional disability. 84/84 represents no disability. Minimal detectable change is 5.7 points. With the addition of the 8 questions in the \"Sports Subscale,\" there are 29 questions, each scored on a 5 point scale with 0 representing \"Unable to do\" and 4 representing \"No difficulty\". The lower the score, the greater the functional disability. 116/116 represents no disability. Minimal detectable change is 12.3 points.       Agustin Daniel, PT  6/23/22    Post Session Pain  Charge Capture   POC Link  Treatment Note Link  MD Guidelines  Zeina

## (undated) DEVICE — BANDAGE,GAUZE,CONFORMING,2"X75",STRL,LF: Brand: MEDLINE INDUSTRIES, INC.

## (undated) DEVICE — FOOT & ANKLE SOFT DR WOMACK: Brand: MEDLINE INDUSTRIES, INC.

## (undated) DEVICE — GOWN,SIRUS,NONRNF,SETINSLV,XL,20/CS: Brand: MEDLINE

## (undated) DEVICE — GLOVE SURG SZ 8 CRM LTX FREE POLYISOPRENE POLYMER BEAD ANTI

## (undated) DEVICE — SOLUTION IRRIG 1000ML 09% SOD CHL USP PIC PLAS CONTAINER

## (undated) DEVICE — GLOVE SURG SZ 65 CRM LTX FREE POLYISOPRENE POLYMER BEAD ANTI

## (undated) DEVICE — GLOVE SURG SZ 8 L12IN FNGR THK79MIL GRN LTX FREE

## (undated) DEVICE — BANDAGE,GAUZE,CONFORMING,3"X75",STRL,LF: Brand: MEDLINE

## (undated) DEVICE — PRECISION THIN, OFFSET (5.5 X 0.38 X 25.0MM)

## (undated) DEVICE — SUTURE VCRL SZ 2-0 L27IN ABSRB UD L26MM CT-2 1/2 CIR J269H

## (undated) DEVICE — GLOVE SURG SZ 65 L12IN FNGR THK79MIL GRN LTX FREE